# Patient Record
(demographics unavailable — no encounter records)

---

## 2024-11-06 NOTE — PHYSICAL EXAM
[Normal] : Gait: normal [de-identified] : General: No acute distress Mental: Alert and oriented x3 Eyes: Conjunctivitis not seen Chest: Symmetric chest rise, no audible wheezing Skin: Bilateral lower extremities absent from rashes and ulcers Abdomen: No distention  Back: Intact skin, no ecchymosis, no swelling Tender across the low back 5/5 strength BLE 2/2 sensation BLE Positive straight leg raise right leg Benign hip exam

## 2024-11-06 NOTE — HISTORY OF PRESENT ILLNESS
[de-identified] : 36-year-old male presents for followup back pain and right lower extremity radiculopathy.  He is doing physical therapy for the past several weeks.  Reports improvement in his low back pain and buttock pain.  Reports persistent numbness and tingling along the right thigh radiating to the knee and lower ankle.  Denies muscle weakness.  The numbness worsens with strenuous activity and holding his baby.  He denies saddle anesthesia, gait instability, changes in bowel or bladder function.  He reports similar symptoms in the past.  He denies prior spine surgeries or injections.

## 2024-11-06 NOTE — DISCUSSION/SUMMARY
[de-identified] : 36-year-old male with chronic low back pain and right lower extremity radiculopathy with numbness.  He has gone through physical therapy and has had similar chronic symptoms.  Reports improved pain, however persistent neurologic deficits.  I recommended an MRI of the lumbar spine for further evaluation.  He may continue with physical therapy, strengthening exercises, Tylenol or NSAIDs as needed.  He will be contacted with the MRI result.